# Patient Record
Sex: MALE
[De-identification: names, ages, dates, MRNs, and addresses within clinical notes are randomized per-mention and may not be internally consistent; named-entity substitution may affect disease eponyms.]

---

## 2018-01-29 ENCOUNTER — HOSPITAL ENCOUNTER (OUTPATIENT)
Dept: HOSPITAL 42 - ED | Age: 58
Setting detail: OBSERVATION
LOS: 1 days | Discharge: HOME | End: 2018-01-30
Attending: HOSPITALIST | Admitting: INTERNAL MEDICINE
Payer: COMMERCIAL

## 2018-01-29 VITALS — BODY MASS INDEX: 23.6 KG/M2

## 2018-01-29 DIAGNOSIS — F41.9: ICD-10-CM

## 2018-01-29 DIAGNOSIS — K27.9: Primary | ICD-10-CM

## 2018-01-29 DIAGNOSIS — R40.2412: ICD-10-CM

## 2018-01-29 DIAGNOSIS — K29.70: ICD-10-CM

## 2018-01-29 DIAGNOSIS — I10: ICD-10-CM

## 2018-01-29 LAB
ALBUMIN SERPL-MCNC: 3.7 G/DL (ref 3–4.8)
ALBUMIN/GLOB SERPL: 1.1 {RATIO} (ref 1.1–1.8)
ALT SERPL-CCNC: 39 U/L (ref 7–56)
AMYLASE SERPL-CCNC: 86 U/L (ref 35–125)
AST SERPL-CCNC: 22 U/L (ref 17–59)
BASOPHILS # BLD AUTO: 0.04 K/MM3 (ref 0–2)
BASOPHILS NFR BLD: 0.6 % (ref 0–3)
BUN SERPL-MCNC: 13 MG/DL (ref 7–21)
CALCIUM SERPL-MCNC: 9.8 MG/DL (ref 8.4–10.5)
EOSINOPHIL # BLD: 0.3 10*3/UL (ref 0–0.7)
EOSINOPHIL NFR BLD: 4.6 % (ref 1.5–5)
ERYTHROCYTE [DISTWIDTH] IN BLOOD BY AUTOMATED COUNT: 12.8 % (ref 11.5–14.5)
GFR NON-AFRICAN AMERICAN: > 60
GRANULOCYTES # BLD: 5.06 10*3/UL (ref 1.4–6.5)
GRANULOCYTES NFR BLD: 69.9 % (ref 50–68)
HDLC SERPL-MCNC: 18 MG/DL (ref 29–60)
HGB BLD-MCNC: 14 G/DL (ref 14–18)
LDLC SERPL-MCNC: 108 MG/DL (ref 0–129)
LIPASE SERPL-CCNC: 143 U/L (ref 23–300)
LYMPHOCYTES # BLD: 1.1 10*3/UL (ref 1.2–3.4)
LYMPHOCYTES NFR BLD AUTO: 15.2 % (ref 22–35)
MAGNESIUM SERPL-MCNC: 2.2 MG/DL (ref 1.7–2.2)
MCH RBC QN AUTO: 30.8 PG (ref 25–35)
MCHC RBC AUTO-ENTMCNC: 33.3 G/DL (ref 31–37)
MCV RBC AUTO: 92.7 FL (ref 80–105)
MONOCYTES # BLD AUTO: 0.7 10*3/UL (ref 0.1–0.6)
MONOCYTES NFR BLD: 9.7 % (ref 1–6)
PLATELET # BLD: 447 10^3/UL (ref 120–450)
PMV BLD AUTO: 9.3 FL (ref 7–11)
RBC # BLD AUTO: 4.54 10^6/UL (ref 3.5–6.1)
TROPONIN I SERPL-MCNC: < 0.01 NG/ML
TROPONIN I SERPL-MCNC: < 0.01 NG/ML
WBC # BLD AUTO: 7.2 10^3/UL (ref 4.5–11)

## 2018-01-29 PROCEDURE — 99285 EMERGENCY DEPT VISIT HI MDM: CPT

## 2018-01-29 PROCEDURE — 85027 COMPLETE CBC AUTOMATED: CPT

## 2018-01-29 PROCEDURE — 84100 ASSAY OF PHOSPHORUS: CPT

## 2018-01-29 PROCEDURE — 87804 INFLUENZA ASSAY W/OPTIC: CPT

## 2018-01-29 PROCEDURE — 80053 COMPREHEN METABOLIC PANEL: CPT

## 2018-01-29 PROCEDURE — 80061 LIPID PANEL: CPT

## 2018-01-29 PROCEDURE — 96360 HYDRATION IV INFUSION INIT: CPT

## 2018-01-29 PROCEDURE — 84484 ASSAY OF TROPONIN QUANT: CPT

## 2018-01-29 PROCEDURE — 93005 ELECTROCARDIOGRAM TRACING: CPT

## 2018-01-29 PROCEDURE — 71045 X-RAY EXAM CHEST 1 VIEW: CPT

## 2018-01-29 PROCEDURE — 85025 COMPLETE CBC W/AUTO DIFF WBC: CPT

## 2018-01-29 PROCEDURE — 83036 HEMOGLOBIN GLYCOSYLATED A1C: CPT

## 2018-01-29 PROCEDURE — 82150 ASSAY OF AMYLASE: CPT

## 2018-01-29 PROCEDURE — 83615 LACTATE (LD) (LDH) ENZYME: CPT

## 2018-01-29 PROCEDURE — 83690 ASSAY OF LIPASE: CPT

## 2018-01-29 PROCEDURE — 82550 ASSAY OF CK (CPK): CPT

## 2018-01-29 PROCEDURE — 84443 ASSAY THYROID STIM HORMONE: CPT

## 2018-01-29 PROCEDURE — 83735 ASSAY OF MAGNESIUM: CPT

## 2018-01-29 PROCEDURE — 93306 TTE W/DOPPLER COMPLETE: CPT

## 2018-01-29 PROCEDURE — 96374 THER/PROPH/DIAG INJ IV PUSH: CPT

## 2018-01-29 PROCEDURE — 36415 COLL VENOUS BLD VENIPUNCTURE: CPT

## 2018-01-29 PROCEDURE — 74177 CT ABD & PELVIS W/CONTRAST: CPT

## 2018-01-29 NOTE — CP.PCM.HP
History of Present Illness





- History of Present Illness


History of Present Illness: 





Isai GarberSylvester PGY1 IM H&P Note for Hospitalist Service





cc: epigastric pain after eating





Mr Moreno is a 57 year old  male with a PMH of HTN who presents to ED 

with epigastric pain x3 days not associated with n/v but is worsened by eating. 

Patient states he usually eats wings and fast food but has had decreased 

appetite for the last 3 days. Also complains of a fever 2 days ago but not 

since then. Patient states that the pain is mainly epigastric, but he feels 

like it radiates diffusely even to his head and is worried that his eating will 

cause him to be "paralyzed in the head" (as he points to his head). Patient 

denies weight changes, n/v/d, bowel/urinary habit changes, fevers/chills, 

shortness of breath. 12-pt ROS was obtained and is otherwise unremarkable. 





PMD: Patient doesn't see one but has seen Dr. Carlos Manuel Gleason before and has 

scripts written by him


Pharm: [facility] 





PMH: as above


PSH: none


Meds: per [facility] ([phone]): Lisinopril/HCTZ 10/325 daily (90 day 

supply was filled in 8/2017)


SHx: patient denies smoking tobacco, ETOH use, and any drug use. Works in 

[employer]. Lives alone/single. 


NKDA


No family hx of heart disease 





Present on Admission





- Present on Admission


Any Indicators Present on Admission: No





Review of Systems





- Review of Systems


All systems: reviewed and no additional remarkable complaints except (as per HPI

)





Past Patient History





- Past Medical History & Family History


Past Medical History?: Yes


Past Family History: Reviewed and not pertinent





- Past Social History


Smoking Status: Never Smoked


Alcohol: None


Drugs: Denies


Home Situation {Lives}: Alone





- CARDIAC


Hx Cardiac Disorders: Yes


Hx Hypertension: Yes





- PULMONARY


Hx Respiratory Disorders: No





- NEUROLOGICAL


Hx Neurological Disorder: No





- HEENT


Hx HEENT Problems: No





- RENAL


Hx Chronic Kidney Disease: No





- ENDOCRINE/METABOLIC


Hx Endocrine Disorders: No





- HEMATOLOGICAL/ONCOLOGICAL


Hx Blood Disorders: No





- INTEGUMENTARY


Hx Dermatological Problems: No





- MUSCULOSKELETAL/RHEUMATOLOGICAL


Hx Musculoskeletal Disorders: No





- GASTROINTESTINAL


Hx Gastrointestinal Disorders: No





- GENITOURINARY/GYNECOLOGICAL


Hx Genitourinary Disorders: No





- PSYCHIATRIC


Hx Psychophysiologic Disorder: No


Hx Substance Use: No





- SURGICAL HISTORY


Hx Surgeries: No





Meds


Allergies/Adverse Reactions: 


 Allergies











Allergy/AdvReac Type Severity Reaction Status Date / Time


 


No Known Allergies Allergy   Verified 01/29/18 09:52














Physical Exam





- Constitutional


Appears: Well, Non-toxic, No Acute Distress





- Head Exam


Head Exam: ATRAUMATIC, NORMAL INSPECTION





- Eye Exam


Eye Exam: EOMI, Normal appearance





- ENT Exam


ENT Exam: Mucous Membranes Moist, Normal Exam





- Neck Exam


Neck exam: Positive for: Normal Inspection





- Respiratory Exam


Respiratory Exam: Clear to Auscultation Bilateral, NORMAL BREATHING PATTERN.  

absent: Chest Wall Tenderness, Rales, Rhonchi, Wheezes, Respiratory Distress





- Cardiovascular Exam


Cardiovascular Exam: RRR, +S1, +S2.  absent: Tachycardia, JVD, Systolic Murmur





- GI/Abdominal Exam


GI & Abdominal Exam: Normal Bowel Sounds, Soft.  absent: Distended, Guarding, 

Tenderness





- Extremities Exam


Extremities exam: Positive for: full ROM, normal inspection.  Negative for: 

calf tenderness, joint swelling, pedal edema





- Back Exam


Back exam: NORMAL INSPECTION.  absent: CVA tenderness (L), CVA tenderness (R)





- Neurological Exam


Neurological exam: Alert, CN II-XII Intact, Oriented x3





- Psychiatric Exam


Psychiatric exam: Anxious, Normal Affect





- Skin


Skin Exam: Normal Color, Warm





Results





- Vital Signs


Recent Vital Signs: 





 Last Vital Signs











Temp  98.8 F   01/29/18 09:40


 


Pulse  80   01/29/18 09:40


 


Resp  18   01/29/18 09:40


 


BP  125/76   01/29/18 09:40


 


Pulse Ox  99   01/29/18 09:40














- Labs


Result Diagrams: 


 01/29/18 10:20





 01/29/18 10:20





Assessment & Plan





- Assessment and Plan (Free Text)


Assessment: 





57 year old  male with a PMH of HTN who presents to ED with epigastric 

pain x3 days not associated with n/v but is worsened by eating. Initial EKG and 

imaging were all negative. Lipase is wnl. UZMA score 19 so low UZMA risk index. 

Pain likely 2/2 gastritis vs PUD, but patient's anxiety may be contributing to 

exaggerated pain. 


Plan: 





1. Epigastric pain likely 2/2 PUD but need to r/o ACS


- serial cardiac enzymes and EKG


- Echo ordered


- Lipid panel, Hgb A1C and TSH ordered


- UDS and UA ordered


- Tylenol and Zofran PRN


- ASA 81mg and Lisinopril 10mg daily 


- PTX daily


- HHD


- dietician referral 





2. Hx HTN


- Lisinopril 10mg daily


- stable for now


- continue to monitor VS Q6





Patient was seen, examined and discussed with attending, Dr. Makayla Phan PGY1


Pager # [phone]

## 2018-01-29 NOTE — CARD
--------------- APPROVED REPORT --------------





EKG Measurement

Heart [id number]

DE 152P40

[id number]

[id number]

[id number]



<Conclusion>

Sinus bradycardia

Otherwise normal ECG

## 2018-01-29 NOTE — CARD
--------------- APPROVED REPORT --------------





EKG Measurement

Heart [id number]

HI 140P41

[id number]

[id number]

[id number]



<Conclusion>

Normal sinus rhythm

Normal ECG

## 2018-01-29 NOTE — RAD
HISTORY:

Abdominal pain



COMPARISON:

No prior. 



FINDINGS:



LUNGS:

The lungs are well inflated and clear.



PLEURA:

No significant pleural effusion identified, no pneumothorax apparent.



CARDIOVASCULAR:

Normal.



OSSEOUS STRUCTURES:

No significant abnormalities.



VISUALIZED UPPER ABDOMEN:

Normal.



OTHER FINDINGS:

None.



IMPRESSION:

No active pulmonary disease.

## 2018-01-29 NOTE — CT
PROCEDURE:  CT Abdomen and Pelvis with contrast



HISTORY:

abd pain



COMPARISON:

None.



TECHNIQUE:

Contrast dose: 100 cc of Omni 350



Radiation dose:



Total exam DLP = 352 mGy-cm.



This CT exam was performed using one or more of the following dose 

reduction techniques: Automated exposure control, adjustment of the 

mA and/or kV according to patient size, and/or use of iterative 

reconstruction technique.



FINDINGS:



LOWER THORAX:

Unremarkable. 



LIVER:

Unremarkable. No gross lesion or ductal dilatation. 



GALLBLADDER AND BILE DUCTS:

Unremarkable. 



PANCREAS:

Unremarkable. No gross lesion or ductal dilatation.



SPLEEN:

Unremarkable. 



ADRENALS:

Unremarkable. No mass. 



KIDNEYS AND URETERS:

Unremarkable. No hydronephrosis. No solid mass. 



VASCULATURE:

Unremarkable. No aortic aneurysm. 



BOWEL:

Unremarkable. No obstruction. No gross mural thickening. 



APPENDIX:

Normal appendix. 



PERITONEUM:

Unremarkable. No free fluid. No free air. 



LYMPH NODES:

Unremarkable. No enlarged lymph nodes. 



BLADDER:

Unremarkable. 



REPRODUCTIVE:

Unremarkable. 



BONES:

No acute fracture. 



OTHER FINDINGS:

None.



IMPRESSION:

Unremarkable contrast enhanced CT of the abdomen and pelvis.

## 2018-01-29 NOTE — ED PDOC
Arrival/HPI





- General


Chief Complaint: Abdominal Pain


Time Seen by Provider: 01/29/18 09:52


Historian: Patient





- History of Present Illness


Narrative History of Present Illness (Text): 





01/29/18 11:08


57-year-old male presents today with a four-day history of diffuse abdominal 

pain greatest in the epigastric region. Patient states the pain is burning and 

achy pain worse after eating. Patient states he feels as if he is having reflux 

of the food into the chest. Patient denies dizziness or weakness. Complaining 

of subjective fever 2 days ago. pt c/o pressure in the chest. Patient 

complaining of decreased appetite. Patient complaining of pain throughout the 

entire body. No medications have been taken at home. Patient denies any urinary 

symptoms. No other complaints











Past Medical History





- Provider Review


Nursing Documentation Reviewed: Yes





- Travel History


Have you recently traveled outside [place] w/in the past 3 mons?: No





- Cardiac


Hx Cardiac Disorders: Yes


Hx Hypertension: Yes





- Pulmonary


Hx Respiratory Disorders: No





- Neurological


Hx Neurological Disorder: No





- HEENT


Hx HEENT Disorder: No





- Renal


Hx Renal Disorder: No





- Endocrine/Metabolic


Hx Endocrine Disorders: No





- Hematological/Oncological


Hx Blood Disorders: No





- Integumentary


Hx Dermatological Disorder: No





- Musculoskeletal/Rheumatological


Hx Musculoskeletal Disorders: No





- Gastrointestinal


Hx Gastrointestinal Disorders: No





- Genitourinary/Gynecological


Hx Genitourinary Disorders: No





- Psychiatric


Hx Psychophysiologic Disorder: No


Hx Substance Use: No





Family/Social History





- Physician Review


Nursing Documentation Reviewed: Yes


Family/Social History: Unknown Family HX


Smoking Status: Never Smoked


Hx Alcohol Use: No


Hx Substance Use: No





Allergies/Home Meds


Allergies/Adverse Reactions: 


Allergies





No Known Allergies Allergy (Verified 01/29/18 09:52)


 








Home Medications: 


 Home Meds











 Medication  Instructions  Recorded  Confirmed


 


Lisinopril/Hydrochlorothiazide 1 each PO DAILY 01/29/18 01/29/18





[Lisinopril-Hctz 10-12.5 mg Tab]   














Review of Systems





- Review of Systems


Constitutional: Fevers.  absent: Fatigue


Respiratory: Cough.  absent: SOB


Cardiovascular: absent: Chest Pain, Palpitations


Gastrointestinal: Abdominal Pain, Nausea.  absent: Constipation, Diarrhea, 

Vomiting


Genitourinary Male: absent: Dysuria, Frequency, Hematuria


Musculoskeletal: Other (bodyaches).  absent: Neck Pain


Skin: absent: Rash, Pruritis


Neurological: absent: Dizziness, Speech Changes


Psychiatric: absent: Anxiety, Depression





Physical Exam


Vital Signs Reviewed: Yes


Vital Signs











  Temp Pulse Resp BP Pulse Ox


 


 01/29/18 09:40  98.8 F  80  18  125/76  99











Temperature: Afebrile


Blood Pressure: Normal


Pulse: Regular


Respiratory Rate: Normal


Appearance: Positive for: Well-Appearing, Non-Toxic, Comfortable


Pain Distress: None


Mental Status: Positive for: Alert and Oriented X 3





- Systems Exam


Head: Present: Atraumatic


Mouth: Present: Moist Mucous Membranes


Neck: Present: Normal Range of Motion


Respiratory/Chest: Present: Clear to Auscultation, Good Air Exchange.  No: 

Respiratory Distress, Accessory Muscle Use


Cardiovascular: Present: Regular Rate and Rhythm, Normal S1, S2.  No: Murmurs


Abdomen: Present: Tenderness (+ epigastric tenderness), Normal Bowel Sounds.  No

: Distention, Peritoneal Signs, Rebound, Guarding


Back: Present: Normal Inspection.  No: CVA Tenderness, Midline Tenderness


Upper Extremity: Present: Normal Inspection, Normal ROM


Lower Extremity: Present: Normal Inspection, Normal ROM


Neurological: Present: GCS=15


Skin: Present: Warm, Dry, Normal Color.  No: Rashes


Psychiatric: Present: Alert, Oriented x 3





Medical Decision Making


ED Course and Treatment: 





01/29/18 11:12


Patient is nontoxic well appearing with stable vital signs presenting with 

abdominal pain, weakness, fatigue, and chest pain








CBC wnl


CMPwnl


Amylase wnl


Lipase wnl








ekg; NSR at 78b/m no st elevations


trop: wnl





rapid flu; negative








CAT scan:FINDINGS:


LOWER THORAX:


Unremarkable. 


LIVER:


Unremarkable. No gross lesion or ductal dilatation. 


GALLBLADDER AND BILE DUCTS:


Unremarkable. 


PANCREAS:


Unremarkable. No gross lesion or ductal dilatation.


SPLEEN:


Unremarkable. 


ADRENALS:


Unremarkable. No mass. 


KIDNEYS AND URETERS:


Unremarkable. No hydronephrosis. No solid mass. 


VASCULATURE:


Unremarkable. No aortic aneurysm. 


BOWEL:


Unremarkable. No obstruction. No gross mural thickening. 


APPENDIX:


Normal appendix. 


PERITONEUM:


Unremarkable. No free fluid. No free air. 


LYMPH NODES:


Unremarkable. No enlarged lymph nodes. 


BLADDER:


Unremarkable. 


REPRODUCTIVE:


Unremarkable. 


BONES:


No acute fracture. 


OTHER FINDINGS:


None.


IMPRESSION:


Unremarkable contrast enhanced CT of the abdomen and pelvis.








cxr; wnl





Patient reassessment: pt feeling slightly better with pepcid; 





asa given. 











Discussed all results with patient in depth








case discussed with ; accepts observational status admission for chest 

pain, abdominal pain. pt with hx of htn, with pressure sensation in chest. 








impression; chest pain, abdominal pain


admit observational status to remote tele. 





- Lab Interpretations


Lab Results: 








 01/29/18 10:20 





 01/29/18 10:20 





 Lab Results





01/29/18 11:43: Lactate Dehydrogenase 433, Total Creatine Kinase 29 L, Troponin 

I < 0.01


01/29/18 10:20: Influenza Typ A,B (EIA) Negative for flu a/b


01/29/18 10:20: WBC 7.2, RBC 4.54, Hgb 14.0, Hct 42.1, MCV 92.7, MCH 30.8, MCHC 

33.3, RDW 12.8, Plt Count 447, MPV 9.3, Gran % 69.9 H, Lymph % (Auto) 15.2 L, 

Mono % (Auto) 9.7 H, Eos % (Auto) 4.6, Baso % (Auto) 0.6, Gran # 5.06, Lymph # 

1.1 L, Mono # 0.7 H, Eos # 0.3, Baso # 0.04


01/29/18 10:20: Sodium 137, Potassium 4.5, Chloride 100, Carbon Dioxide 27, 

Anion Gap 15, BUN 13, Creatinine 0.9, Est GFR (African Amer) > 60, Est GFR (Non-

Af Amer) > 60, Random Glucose 92, Calcium 9.8, Total Bilirubin 0.5, AST 22, ALT 

39, Alkaline Phosphatase 40, Total Protein 7.1, Albumin 3.7, Globulin 3.5, 

Albumin/Globulin Ratio 1.1, Amylase 86, Lipase 143











- RAD Interpretation


Radiology Orders: 








01/29/18 10:16


ABD & PELVIS IV CONTRAST ONLY [CT] Stat 


CHEST PORTABLE [RAD] Stat 














- Medication Orders


Current Medication Orders: 








Acetaminophen (Tylenol 325mg Tab)  650 mg PO Q4 PRN


   PRN Reason: Fever >100.4 F


Aspirin (Ecotrin)  81 mg PO DAILY [facility]


Lisinopril (Zestril)  10 mg PO DAILY [facility]


Ondansetron HCl (Zofran Inj)  4 mg IVP Q4H PRN


   PRN Reason: Nausea/Vomiting


Pantoprazole Sodium (Protonix Ec Tab)  40 mg PO 0600 [facility]





Discontinued Medications





Aspirin (Aspirin)  325 mg PO STAT STA


   Stop: 01/29/18 13:57


   Last Admin: 01/29/18 14:30  Dose: 325 mg





Famotidine (Pepcid)  20 mg IVP STAT STA


   Stop: 01/29/18 10:17


   Last Admin: 01/29/18 10:28  Dose: 20 mg





IVP Administration


 Document     01/29/18 10:28  FARHAN  (Rec: 01/29/18 10:28  FARHAN  [id number])


     Charges for Administration


      # of IVP Administrations                   1





Sodium Chloride (Sodium Chloride 0.9%)  1,000 mls @ 999 mls/hr IV .Q1H1M STA


   Stop: 01/29/18 11:16


   Last Admin: 01/29/18 10:28  Dose: 999 mls/hr





eMAR Start Stop


 Document     01/29/18 10:28  FARHAN  (Rec: 01/29/18 10:28  FARHAN  [id number])


     Intravenous Solution


      Start Date                                 01/29/18


      Start Time                                 10:28


      End Date                                   01/29/18


      End time                                   11:28


      Total Infusion Time                        60














Disposition/Present on Arrival





- Present on Arrival


Any Indicators Present on Arrival: No


History of DVT/PE: No


History of Uncontrolled Diabetes: No


Urinary Catheter: No


History of Decub. Ulcer: No


History Surgical Site Infection Following: None





- Disposition


Have Diagnosis and Disposition been Completed?: Yes


Diagnosis: 


 Chest pain, Abdominal pain





Disposition: HOSPITALIZED


Disposition Time: 13:00


Patient Plan: Observation


Condition: FAIR

## 2018-01-30 VITALS — RESPIRATION RATE: 20 BRPM | TEMPERATURE: 97.9 F | OXYGEN SATURATION: 98 %

## 2018-01-30 VITALS — DIASTOLIC BLOOD PRESSURE: 71 MMHG | SYSTOLIC BLOOD PRESSURE: 114 MMHG | HEART RATE: 56 BPM

## 2018-01-30 LAB
ALBUMIN SERPL-MCNC: 3.2 G/DL (ref 3–4.8)
ALBUMIN/GLOB SERPL: 1.1 {RATIO} (ref 1.1–1.8)
ALT SERPL-CCNC: 34 U/L (ref 7–56)
AST SERPL-CCNC: 24 U/L (ref 17–59)
BUN SERPL-MCNC: 12 MG/DL (ref 7–21)
CALCIUM SERPL-MCNC: 9.6 MG/DL (ref 8.4–10.5)
ERYTHROCYTE [DISTWIDTH] IN BLOOD BY AUTOMATED COUNT: 12.7 % (ref 11.5–14.5)
GFR NON-AFRICAN AMERICAN: > 60
HGB BLD-MCNC: 12.8 G/DL (ref 14–18)
MCH RBC QN AUTO: 30.3 PG (ref 25–35)
MCHC RBC AUTO-ENTMCNC: 32.9 G/DL (ref 31–37)
MCV RBC AUTO: 92 FL (ref 80–105)
PLATELET # BLD: 411 10^3/UL (ref 120–450)
PMV BLD AUTO: 9.4 FL (ref 7–11)
RBC # BLD AUTO: 4.23 10^6/UL (ref 3.5–6.1)
TROPONIN I SERPL-MCNC: < 0.01 NG/ML
WBC # BLD AUTO: 6 10^3/UL (ref 4.5–11)

## 2018-01-30 NOTE — CARD
--------------- APPROVED REPORT --------------





EKG Measurement

Heart [id number]

VA 152P54

[id number]

[id number]

[id number]



<Conclusion>

Normal sinus rhythm

Normal ECG

## 2018-01-30 NOTE — CARD
--------------- APPROVED REPORT --------------





EXAM: Two-dimensional and M-mode echocardiogram with Doppler and 

color Doppler.



INDICATION

Chest Pain 



2D DIMENSIONS 

Left Atrium (2D)3.4   (1.6-4.0cm)IVSd1.1   (0.7-1.1cm)

LVDd4.4   (3.9-5.9cm)PWd1.0   (0.7-1.1cm)

LVDs2.6   (2.5-4.0cm)FS (%) 40.5   %

LVEF (%)71.4   (>50%)



M-Mode DIMENSIONS 

Aortic Root2.70   (2.2-3.7cm)Aortic Cusp Exc.2.00   (1.5-2.0cm)



Aortic Valve

AoV Peak Sccawbuh559.0cm/Courtney Peak GR.7mmHg



Mitral Valve

E/A ratio0.0



TDI

Lateral E' Peak V12.90cm/sMedial E' Peak V10.80cm/sE/Lateral 

E'0.0

E/Medial E'0.0



Tricuspid Valve

TR Peak Jippzpqk725og/sRAP [id number] Peak Gr.18mmHg

[id number]



 LEFT VENTRICLE 

The left ventricle is normal size. There is normal left ventricular 

wall thickness. The left ventricular function is normal.EF-55-60% 

There is normal LV segmental wall motion. The left ventricular 

diastolic function is normal. No left ventricle thrombus noted on 

this study. There is no ventricular septal defect visualized. There 

is no left ventricular aneurysm. There is no mass noted in the left 

ventricle.



 RIGHT VENTRICLE 

The right ventricle is normal size. There is normal right ventricular 

wall thickness. The right ventricular systolic function is normal.



 ATRIA 

The left atrium size is normal. The right atrium size is normal. The 

interatrial septum is intact with no evidence for an atrial septal 

defect.



 AORTIC VALVE 

The aortic valve is thickened but opens well. No aortic regurgitation 

is present. There is no aortic valvular stenosis. There is no aortic 

valvular vegetation.



 MITRAL VALVE 

The mitral valve is thickened but opens well. Mitral regurgitation is 

trace. There is no mitral valve stenosis. There is no evidence of 

mitral valve prolapse.



 TRICUSPID VALVE 

The tricuspid valve leaflets are thickened , but open well. There is 

mild tricuspid regurgitation.RVSP-28 mmof Hg. There is no tricuspid 

valve stenosis. There is no tricuspid valve prolapse or vegetation.



 PULMONIC VALVE 

The pulmonary valve is normal in structure. There is no pulmonic 

valvular regurgitation. There is no pulmonic valvular stenosis.



 GREAT VESSELS 

The aortic root is normal in size. The ascending aorta is normal in 

size. The pulmonary artery is normal. The IVC is normal in size and 

collapses >50% with inspiration.



 PERICARDIAL EFFUSION 

There is no pleural effusion. There is no pericardial effusion.



<Conclusion>

The left ventricle is normal size.

There is normal left ventricular wall thickness.

The left ventricular function is normal.EF-55-60%

There is normal LV segmental wall motion.

Mitral regurgitation is trace.

There is mild tricuspid regurgitation.RVSP-28 mmof Hg.

The IVC is normal in size and collapses >50% with inspiration.

There is no pericardial effusion.

## 2018-01-31 NOTE — CP.PCM.DIS
Provider





- Provider


Date of Admission: 


01/29/18 14:01





Attending physician: 


Dixie Benavides MD





Primary care physician: 


NO PRIMARY CARE PROVIDER





Time Spent in preparation of Discharge (in minutes): 40





Diagnosis





- Discharge Diagnosis


(1) Peptic ulcer disease


Status: Acute   





(2) Gastritis


Status: Acute   





(3) HTN (hypertension)


Status: Chronic   





Hospital Course





- Lab Results


Lab Results: 


 Most Recent Lab Values











WBC  6.0 10^3/ul (4.5-11.0)   01/30/18  05:45    


 


RBC  4.23 10^6/uL (3.5-6.1)   01/30/18  05:45    


 


Hgb  12.8 g/dL (14.0-18.0)  L  01/30/18  05:45    


 


Hct  38.9 % (42.0-52.0)  L  01/30/18  05:45    


 


MCV  92.0 fl (80.0-105.0)   01/30/18  05:45    


 


MCH  30.3 pg (25.0-35.0)   01/30/18  05:45    


 


MCHC  32.9 g/dl (31.0-37.0)   01/30/18  05:45    


 


RDW  12.7 % (11.5-14.5)   01/30/18  05:45    


 


Plt Count  411 10^3/uL (120.0-450.0)   01/30/18  05:45    


 


MPV  9.4 fl (7.0-11.0)   01/30/18  05:45    


 


Gran %  69.9 % (50.0-68.0)  H  01/29/18  10:20    


 


Lymph % (Auto)  15.2 % (22.0-35.0)  L  01/29/18  10:20    


 


Mono % (Auto)  9.7 % (1.0-6.0)  H  01/29/18  10:20    


 


Eos % (Auto)  4.6 % (1.5-5.0)   01/29/18  10:20    


 


Baso % (Auto)  0.6 % (0.0-3.0)   01/29/18  10:20    


 


Gran #  5.06  (1.4-6.5)   01/29/18  10:20    


 


Lymph #  1.1  (1.2-3.4)  L  01/29/18  10:20    


 


Mono #  0.7  (0.1-0.6)  H  01/29/18  10:20    


 


Eos #  0.3  (0.0-0.7)   01/29/18  10:20    


 


Baso #  0.04 K/mm3 (0.0-2.0)   01/29/18  10:20    


 


Sodium  140 mmol/L (132-148)   01/30/18  05:45    


 


Potassium  4.4 mmol/L (3.6-5.0)   01/30/18  05:45    


 


Chloride  106 mmol/L ()   01/30/18  05:45    


 


Carbon Dioxide  25 mmol/L (21-33)   01/30/18  05:45    


 


Anion Gap  14  (10-20)   01/30/18  05:45    


 


BUN  12 mg/dL (7-21)   01/30/18  05:45    


 


Creatinine  0.9 mg/dl (0.8-1.5)   01/30/18  05:45    


 


Est GFR ( Amer)  > 60   01/30/18  05:45    


 


Est GFR (Non-Af Amer)  > 60   01/30/18  05:45    


 


Random Glucose  82 mg/dL ()   01/30/18  05:45    


 


Hemoglobin A1c  6.2 % (4.2-6.5)   01/29/18  11:20    


 


Calcium  9.6 mg/dL (8.4-10.5)   01/30/18  05:45    


 


Phosphorus  3.4 mg/dL (2.5-4.5)   01/29/18  11:20    


 


Magnesium  2.2 mg/dL (1.7-2.2)   01/29/18  11:20    


 


Total Bilirubin  0.6 mg/dL (0.2-1.3)   01/30/18  05:45    


 


AST  24 U/L (17-59)   01/30/18  05:45    


 


ALT  34 U/L (7-56)   01/30/18  05:45    


 


Alkaline Phosphatase  32 U/L ()  L  01/30/18  05:45    


 


Lactate Dehydrogenase  350 U/L (333-699)   01/30/18  00:20    


 


Total Creatine Kinase  30 U/L ()  L  01/30/18  00:20    


 


Troponin I  < 0.01 ng/mL  01/30/18  00:20    


 


Total Protein  6.2 g/dL (5.8-8.3)   01/30/18  05:45    


 


Albumin  3.2 g/dL (3.0-4.8)   01/30/18  05:45    


 


Globulin  3.0 gm/dL  01/30/18  05:45    


 


Albumin/Globulin Ratio  1.1  (1.1-1.8)   01/30/18  05:45    


 


Triglycerides  180 mg/dL ()  H  01/29/18  11:20    


 


Cholesterol  151 mg/dL (130-200)   01/29/18  11:20    


 


LDL Cholesterol Direct  108 mg/dL (0-129)   01/29/18  11:20    


 


HDL Cholesterol  18 mg/dL (29-60)  L  01/29/18  11:20    


 


Amylase  86 U/L ()   01/29/18  10:20    


 


Lipase  143 U/L ()   01/29/18  10:20    


 


TSH 3rd Generation  0.80 mIU/mL (0.46-4.68)   01/29/18  11:20    


 


Influenza Typ A,B (EIA)  Negative for flu a/b  (NEGATIVE)   01/29/18  10:20    














- Hospital Course


Hospital Course: 





Mr Moreno is a 57 year old  male with a PMH of HTN who presented to ED 

with epigastric pain x3 days not associated with n/v but is worsened by eating. 

Patient denies any coughing up blood, vomiting, dark stools, weight loss or 

other alarming signs. UZMA score was 19 so low risk. Patient was admitted and 

troponin I x3 was negative so ACS was ruled out. EKGs were unremarkable. CXR 

was unremarkable. CT abd/pelvis was done and was unremarkable. Echo was done 

and was also unremarkable. Lipase, TSH, Lipid panel and Hgb A1C were all WNL. 

His hx of HTN was also managed while in house. Patient improved and tolerated 

diet on protonix. Patient educated and discharged on protonix and Lisinopril/

HCTZ and will follow up either at [facility] [place] Dr. Carlos Manuel Matson who he's 

seen in the past. Patient educated on alarming signs which should prompt him to 

come back in for evaluation. Patient medically stable for discharge. 





Discharge Exam





- Head Exam


Head Exam: ATRAUMATIC, NORMAL INSPECTION





- Eye Exam


Eye Exam: EOMI, Normal appearance





- ENT Exam


ENT Exam: Mucous Membranes Moist





- Neck Exam


Neck exam: Normal Inspection





- Respiratory Exam


Respiratory Exam: Clear to PA & Lateral, NORMAL BREATHING PATTERN.  absent: 

Rales, Rhonchi, Wheezes





- Cardiovascular Exam


Cardiovascular Exam: RRR, +S1, +S2





- GI/Abdominal Exam


GI & Abdominal Exam: Normal Bowel Sounds, Soft.  absent: Distended, Tenderness





- Extremities Exam


Extremities exam: full ROM, normal inspection





- Back Exam


Back exam: NORMAL INSPECTION





- Neurological Exam


Neurological exam: Alert, CN II-XII Intact, Oriented x3





- Psychiatric Exam


Psychiatric exam: Normal Affect, Normal Mood





- Skin


Skin Exam: Normal Color, Warm





Discharge Plan





- Discharge Medications


Prescriptions: 


Lisinopril/Hydrochlorothiazide [Lisinopril-Hctz 10-12.5 mg Tab] 1 each PO DAILY 

#30 tablet


Pantoprazole [Protonix EC Tab] 40 mg PO DAILY #30 ect





- Follow Up Plan


Condition: FAIR


Disposition: HOME/ ROUTINE


Instructions:  Chest Pain (DC), Acute Abdominal Pain (DC)


Additional Instructions: 


- please follow up with [facility] or Dr. Matson


- please take medication as prescribed


- if symptoms don't improve, please visit a doctor for further workup


- if you experience shortness of breath, severe chest pain, sweating or fevers/

chills, please return to ER for evaluation


Referrals: 


[facility] [place]


Carlos Manuel Gleason MD [Staff Provider] -